# Patient Record
Sex: FEMALE | Race: WHITE | NOT HISPANIC OR LATINO
[De-identification: names, ages, dates, MRNs, and addresses within clinical notes are randomized per-mention and may not be internally consistent; named-entity substitution may affect disease eponyms.]

---

## 2022-01-18 PROBLEM — Z00.00 ENCOUNTER FOR PREVENTIVE HEALTH EXAMINATION: Status: ACTIVE | Noted: 2022-01-18

## 2022-01-19 ENCOUNTER — APPOINTMENT (OUTPATIENT)
Dept: ORTHOPEDIC SURGERY | Facility: CLINIC | Age: 67
End: 2022-01-19
Payer: MEDICARE

## 2022-01-19 VITALS — HEIGHT: 62 IN | BODY MASS INDEX: 25.21 KG/M2 | WEIGHT: 137 LBS

## 2022-01-19 DIAGNOSIS — Z86.59 PERSONAL HISTORY OF OTHER MENTAL AND BEHAVIORAL DISORDERS: ICD-10-CM

## 2022-01-19 DIAGNOSIS — Z78.9 OTHER SPECIFIED HEALTH STATUS: ICD-10-CM

## 2022-01-19 DIAGNOSIS — Z82.61 FAMILY HISTORY OF ARTHRITIS: ICD-10-CM

## 2022-01-19 PROCEDURE — 20610 DRAIN/INJ JOINT/BURSA W/O US: CPT | Mod: RT

## 2022-01-19 PROCEDURE — 73564 X-RAY EXAM KNEE 4 OR MORE: CPT | Mod: 50

## 2022-01-19 PROCEDURE — 99204 OFFICE O/P NEW MOD 45 MIN: CPT | Mod: 25

## 2022-01-19 RX ORDER — VENLAFAXINE 100 MG/1
100 TABLET ORAL
Refills: 0 | Status: ACTIVE | COMMUNITY

## 2022-01-19 RX ORDER — MELOXICAM 15 MG/1
15 TABLET ORAL DAILY
Qty: 30 | Refills: 1 | Status: ACTIVE | COMMUNITY
Start: 2022-01-19 | End: 1900-01-01

## 2022-01-19 RX ORDER — ALENDRONATE SODIUM 70 MG/1
TABLET ORAL
Refills: 0 | Status: ACTIVE | COMMUNITY

## 2022-01-19 RX ORDER — BUPROPION HYDROCHLORIDE 300 MG/1
300 TABLET, EXTENDED RELEASE ORAL
Refills: 0 | Status: ACTIVE | COMMUNITY

## 2022-01-19 NOTE — PROCEDURE
[de-identified] : The right knee was prepped with alchohol and ethyl chloride was used to numb the skin. 1.5 cc of xylocaine 1% was placed for local anaesthesia. An injection durolane (60 mg/3 ml) was then given without complication into the  joint. Patient instructed that there may be an inflammatory flare for 24 hrs , to use ice or advil if needed. \par \par Lot number [id number]\par Exp: 4/30/2024\par \par

## 2022-01-19 NOTE — HISTORY OF PRESENT ILLNESS
[de-identified] : 65 y/o female presents today for initial visit for right knee pain for 3-4 years. She denies any injury or trauma. 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 Patient states she saw Dr.Johnny Rincon at [facility] back in November 2020 who said she had a torn meniscus and needed surgery, however she has had no prior MRI done. Shes had cortisone injections in the past and stated they helped for a short period of time. She also did two bouts of physical therapy and has tried NSAIDs which only help temporarily. Currently she is still experiencing medial knee pain with stairs and walking. She denies any locking or bucking. She's here today to discuss further treatment options.

## 2022-01-19 NOTE — PHYSICAL EXAM
[de-identified] : Patient is well nourished, well-developed, in no acute distress, with appropriate mood and affect. The patient is oriented to time, place, and person. Gait evaluation does not reveal a limp. The skin examination does not reveal obvious lesions, lacerations, or ecchymosis.\par \par Right Knee: AROM 0-130. Positive medial joint tenderness. Negative Meera. Positive Rhonda's. Negative Lachman. Negative Varus/Valgus stress test. Quads 5/5  [de-identified] : Standard 4-view radiographs of the bilateral knees obtained today were reviewed today and show no acute fracture or dislocation. Right Knee Medial compartment KL2-KL3 changes \par \par \par

## 2022-01-19 NOTE — DISCUSSION/SUMMARY
[de-identified] : 68 y/o female with likely right medial compartment DJD vs degenerative medial meniscus tear. XR today shows Right Knee Medial compartment KL2-KL3 changes. She previously has seen Dr.Johnny Rincon at [facility] back and was treated with NSAIDs, Physical therapy, and cortisone injections which only helped temporarily. Given she has had over a year of conservative RX and still is experiencing persistent medial right knee pain, I feel she needs an MRI to r/o osteonecrosis or any subchondral edema vs meniscus tear.  \par \par Today we discussed conservative care with HA injections as well as surgical options. Given her medial compartment  DJD and lack of true locking I think it is reasonable to continue conservative care as she hasn't tried any injectables other than cortisone.  We also long discussion today about the risks and benefits of various Orthobiologic injection procedures. These included PRP, Lipogems/lipoaspirate injections and BMAC. The fact that these treatments are investigational and not approved by any insurance product was also discussed. \par \par \par Plan:\par - Durolane injection today for right knee\par - Mobic 15mg daily\par - Continue HEP for quad, core gluteal strengthening \par - MRI of right knee to r/o osteonecrosis vs meniscus tear. \par

## 2022-02-02 ENCOUNTER — TRANSCRIPTION ENCOUNTER (OUTPATIENT)
Age: 67
End: 2022-02-02

## 2022-02-09 ENCOUNTER — APPOINTMENT (OUTPATIENT)
Dept: ORTHOPEDIC SURGERY | Facility: CLINIC | Age: 67
End: 2022-02-09
Payer: MEDICARE

## 2022-02-09 PROCEDURE — 99213 OFFICE O/P EST LOW 20 MIN: CPT

## 2022-02-09 NOTE — HISTORY OF PRESENT ILLNESS
[de-identified] : 65 y/o female presents today for follow up for right knee. Patient states she has some definite relief from Durolane injection given on 1/19/2022. Patient had an MRI on 1/27/22 and is here today to review results and discuss further treatment options.

## 2022-02-09 NOTE — DISCUSSION/SUMMARY
[de-identified] : Cha's MRI is reviewed and shows medial joint djd and medial meniscus tear with extrusion and root involvement. \par However x-rays show varus djd. \par Therefore I have explained to her that surgically arthroscopic meniscus surgery is unlikely to be successful and unicondylar knee replacement would be the correct option. \par Her symptoms have been mitigated significantly with Durolane\par Plan \par Physical therapy \par f/u 3 months to determine if repeat injection is needed or if she is having any increased pain with ambulation we could consider  brace. \par \par \par

## 2022-05-12 ENCOUNTER — APPOINTMENT (OUTPATIENT)
Dept: ORTHOPEDIC SURGERY | Facility: CLINIC | Age: 67
End: 2022-05-12
Payer: MEDICARE

## 2022-05-12 PROCEDURE — 99213 OFFICE O/P EST LOW 20 MIN: CPT | Mod: 25

## 2022-05-12 PROCEDURE — 20610 DRAIN/INJ JOINT/BURSA W/O US: CPT | Mod: RT

## 2022-05-12 RX ORDER — MELOXICAM 15 MG/1
15 TABLET ORAL DAILY
Qty: 30 | Refills: 2 | Status: COMPLETED | COMMUNITY
Start: 2022-05-12 | End: 2022-08-10

## 2022-05-12 NOTE — HISTORY OF PRESENT ILLNESS
[de-identified] : Cha returns having had some improvement from the HA injection 6 months ago. SHe still has medial joint pain ambulating more than 2-3 blocks due to her varus knee medial joitn OA. \par

## 2022-05-12 NOTE — PROCEDURE
[de-identified] : The right knee was prepped with alchohol and ethyl chloride was used to numb the skin. 1.5 cc of xylocaine 1% was placed for local anaesthesia. An injection durolane (60 mg/3 ml) was then given without complication into the  joint. Patient instructed that there may be an inflammatory flare for 24 hrs , to use ice or advil if needed. Lot umber [id number]\par exp 10/31/2024\par \par

## 2022-05-12 NOTE — PROCEDURE
[de-identified] : The right knee was prepped with alchohol and ethyl chloride was used to numb the skin. 1.5 cc of xylocaine 1% was placed for local anaesthesia. An injection durolane (60 mg/3 ml) was then given without complication into the  joint. Patient instructed that there may be an inflammatory flare for 24 hrs , to use ice or advil if needed. Lot umber [id number]\par exp 10/31/2024\par \par

## 2022-05-12 NOTE — HISTORY OF PRESENT ILLNESS
[de-identified] : Cha returns having had some improvement from the HA injection 6 months ago. SHe still has medial joint pain ambulating more than 2-3 blocks due to her varus knee medial joitn OA. \par

## 2022-05-12 NOTE — DISCUSSION/SUMMARY
[de-identified] : Patient had repeat Durolane today. However I am highly reccomending and have prescribed a medial unloading brace for Further Rx here to help her ambulation due to her significant medial joint varus djd. \par SHe is an excellent candidate fro a UNicompartmental KR but does not want surgery. \par Plan \par Durolane injection\par Medial unloading brace \par f/u 4 weeks

## 2022-05-12 NOTE — DISCUSSION/SUMMARY
[de-identified] : Patient had repeat Durolane today. However I am highly reccomending and have prescribed a medial unloading brace for Further Rx here to help her ambulation due to her significant medial joint varus djd. \par SHe is an excellent candidate fro a UNicompartmental KR but does not want surgery. \par Plan \par Durolane injection\par Medial unloading brace \par f/u 4 weeks

## 2022-07-07 ENCOUNTER — APPOINTMENT (OUTPATIENT)
Dept: ORTHOPEDIC SURGERY | Facility: CLINIC | Age: 67
End: 2022-07-07

## 2022-07-07 PROCEDURE — 99213 OFFICE O/P EST LOW 20 MIN: CPT

## 2022-07-07 NOTE — PHYSICAL EXAM
[de-identified] : Right knee= no effusion, ROM 0-120, medial joint tenderness, positive varus laxity, Quad 5/5

## 2022-07-07 NOTE — HISTORY OF PRESENT ILLNESS
[de-identified] : 67 y/o female presents today for follow up for right knee pain. Patient states that together with the injection and the brace she has been doing much better but still experiencing pain from time to time.

## 2022-07-07 NOTE — DISCUSSION/SUMMARY
[de-identified] : 65 yo female is doing well today following daily use of medial unloading brace and durolane injection. She is still having occasional pain so I suggest she consult Dr. Gonzalez to discuss future UNI, she would be an excellent candidate. \par \par Plan\par continue to use  brace\par consult with Dr. GONZALEZ regarding UNI in the winter and the expected post op course and success etc. \par If she decides against surgery will consider follow up HA injection in December.

## 2022-09-30 ENCOUNTER — OUTPATIENT (OUTPATIENT)
Dept: OUTPATIENT SERVICES | Facility: HOSPITAL | Age: 67
LOS: 1 days | End: 2022-09-30
Payer: MEDICARE

## 2022-09-30 ENCOUNTER — APPOINTMENT (OUTPATIENT)
Dept: ORTHOPEDIC SURGERY | Facility: CLINIC | Age: 67
End: 2022-09-30

## 2022-09-30 ENCOUNTER — RESULT REVIEW (OUTPATIENT)
Age: 67
End: 2022-09-30

## 2022-09-30 VITALS
HEIGHT: 62 IN | SYSTOLIC BLOOD PRESSURE: 148 MMHG | DIASTOLIC BLOOD PRESSURE: 78 MMHG | HEART RATE: 76 BPM | WEIGHT: 137 LBS | BODY MASS INDEX: 25.21 KG/M2 | OXYGEN SATURATION: 99 %

## 2022-09-30 DIAGNOSIS — M23.303 OTHER MENISCUS DERANGEMENTS, UNSPECIFIED MEDIAL MENISCUS, RIGHT KNEE: ICD-10-CM

## 2022-09-30 DIAGNOSIS — M25.561 PAIN IN RIGHT KNEE: ICD-10-CM

## 2022-09-30 PROCEDURE — 73564 X-RAY EXAM KNEE 4 OR MORE: CPT

## 2022-09-30 PROCEDURE — 73564 X-RAY EXAM KNEE 4 OR MORE: CPT | Mod: 26,50

## 2022-09-30 PROCEDURE — 99214 OFFICE O/P EST MOD 30 MIN: CPT

## 2022-10-01 PROBLEM — M25.561 RIGHT KNEE PAIN: Noted: 2022-01-19

## 2022-10-01 PROBLEM — M23.303 DERANGEMENT OF MEDIAL MENISCUS OF RIGHT KNEE: Noted: 2022-01-19

## 2022-10-01 NOTE — HISTORY OF PRESENT ILLNESS
[4] : a current pain level of 4/10 [Walking] : worsened by walking [Ice] : relieved by ice [Rest] : relieved by rest [de-identified] : 9/30/22: 66 y/o female referred by Dr. Linares for evaluation of right medial compartment OA for which she has undergone an extensive course of conservative management, here primarily to discuss possibility of right medal compartmental knee arthroplasty. The patient has been dealing with right knee pain for several years which is localized to the medial joint line with radiation down to the mid-tibia. She also notes plantar fasciitis of the right foot. She states her most recent treatment so far has been a Durolane injection which provided good relief. She takes Tylenol as needed for pain. She denies any ambulatory limitation but notes her pain is worse when walking up and down steps. 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 She lives with her  in a flat apartment that has an elevator. \par \par PMH: osteoporosis, depression and anxiety.\par \par She denies any allergies.  [de-identified] : patient states pain is sharp and dull  [de-identified] : massage

## 2022-10-01 NOTE — END OF VISIT
[FreeTextEntry3] : All medical record entries made by the Scribe were at my, Dr. Erik Guillory, direction and personally dictated by me on 09/30/2022. I have reviewed the chart and agree that the record accurately reflects my personal performance of the history, physical exam, assessment and plan. I have also personally directed, reviewed, and agreed with the chart.

## 2022-10-01 NOTE — PHYSICAL EXAM
[de-identified] : General appearance: well nourished and hydrated, pleasant, alert and oriented x 3, cooperative.  \par HEENT: normocephalic, EOM intact, wearing mask, external auditory canal clear.  \par Cardiovascular: no lower leg edema, no varicosities, dorsalis pedis pulses palpable and symmetric.  \par Lymphatics: no palpable lymphadenopathy, no lymphedema.  \par Neurologic: sensation is normal, no muscle weakness in upper or lower extremities, patella tendon reflexes present and symmetric.  \par Dermatologic: skin moist, warm, no rash.  \par Spine: cervical spine with normal lordosis and painless range of motion, thoracic spine with normal kyphosis and painless range of motion, lumbosacral spine with normal lordosis and painless range of motion.  \par Gait: normal gait pattern, presenting with a soft knee sleeve for the right side. No other assistive device.\par \par Left knee: \par - Focal soft tissue swelling: none\par - Ecchymosis: none\par - Erythema: none\par - Effusion: none\par - Wounds: none\par - Alignment: normal\par - Tenderness: none\par - ROM: 0-140 \par - Collateral laxity: none\par - Cruciate laxity: none\par - Meniscal signs: positive Rhonda and Meera\par - Popliteal angle (degrees): 30\par - Quad strength: 5/5\par \par Right knee:\par - Focal soft tissue swelling: minimal\par - Ecchymosis: none\par - Erythema: none\par - Effusion: small\par - Wounds: none\par - Alignment: mild varus\par - Tenderness: negative lateral joint line tenderness, positive medial joint line tenderness. Negative peripatellar tenderness, negative pain on patellar compression through her entire ROM\par - ROM: 3-135, pain on terminal flexion\par - Collateral laxity: mild pseudolaxity to varus stress\par - Cruciate laxity: none\par - Popliteal angle (degrees): \par - Quad strength: 5/5 [de-identified] : B/L knee XR were reviewed, dated 09/30/2022 from [facility]. \par -Left knee: normal alignment, no arthritis. Patellar height is normal, patellar tracking is central. \par -Right knee: normal alignment, advanced medial compartmental osteoarthritis, bone on bone with some associated subchondral cystic change. There are minimal superior and lateral patellar osteophytes. Otherwise, no evidence of lateral or patellofemoral compartment disease. Patellar height is normal, patellar tracking is central. \par \par MRI of right knee was reviewed, dated 01/27/2022 from [facility].\par - This demonstrates a small, under-surface oblique tear of the lateral meniscus at the posterior horn/posterior root junction. There is a severe degenerative disease in the medial compartment including a complex tear of the medial meniscus. There is degenerative signal within the patellar cartilage with a normal TT/TG distance. Mild patellar and quadriceps tendinosis.

## 2022-10-01 NOTE — DISCUSSION/SUMMARY
[de-identified] : 68 y/o female with right medial unicompartmental OA \par -While she does have MRI imaging of some patellofemoral and some lateral compartment disease, I feel that these are clinically asymptomatic at this time and I feel that she is an excellent candidate of right medial unicompartmental knee arthroplasty with Michael robotic assistance\par - The relative merits of partial and total knee replacement were discussed in detail. The patient was informed that partial knee replacement preserves bone and cartilage as compared to total knee replacement and maintains the function of the anterior cruciate ligament. In appropriately selected patients, partial knee replacement can result in a faster, less painful recovery and a more "normal" feel to the knee with improved function in deep flexion activities. The patient was also informed that the published 10 year reoperation rate is somewhat higher for partial knee replacement, with fixation failure, progression of disease affecting un-resurfaced portions of the joint, polyethylene wear and unexplained pain being the most common causes of revision surgery. The patient was informed that conversion of partial knee replacement to total knee replacement is typically more complex than primary total knee replacement but less complex and invasive than revision of total knee replacement. We discussed the details of both procedures, the expected recovery periods, and the expected outcomes. We discussed the likelihood of satisfaction after complete recovery, and the potential causes of dissatisfaction. The importance of active patient participation in the rehabilitation protocol was emphasized, along with its influence on short and long-term outcomes.  We discussed the possibility that intra-operative findings would dictate conversion to total knee replacement, and the influence that change would have on recovery and long term outcome. Specific risks of partial and total knee replacement were discussed in detail. We discussed the risk of surgical site complications including but not limited to: surgical site infection, wound healing complications, bone fracture, tendon or ligament injury, neurovascular injury, hemorrhage, postoperative stiffness or instability, persistent pain and need for reoperation or manipulation under anesthesia. We discussed surgical blood loss and the small risk of requiring blood transfusion. We discussed the risk of perioperative medical complications, including but not limited to catheter-associated urinary tract infection, venous thromboembolism and other cardiopulmonary complications. We discussed anesthetic options and the small risk of anesthesia-related complications. We discussed the durability of knee replacements and limitations related to progression of arthritis in remaining compartments, polyethylene wear, osteolysis, loosening and unexplained pain. The role of the robot was discussed with respect to bone preparation and ligament balancing. The patient was given a copy of my preoperative packet with additional information about the procedure.  The patient gave informed consent for the surgical procedure and was instructed to speak to my surgical coordinator to arrange the logistics of surgical scheduling, presurgical testing, and medical optimization and clearance. \par - She indicated that she would like to plan for a surgery sometime in the winter, likely in December or January due to work constraints and we will accommodate that. She will be booked for a convenient date with standard medical clearance. \par - She was encouraged to call or come back with any concerns or issues.

## 2022-10-12 ENCOUNTER — APPOINTMENT (OUTPATIENT)
Dept: ORTHOPEDIC SURGERY | Facility: CLINIC | Age: 67
End: 2022-10-12

## 2022-10-12 PROCEDURE — 99213 OFFICE O/P EST LOW 20 MIN: CPT

## 2022-10-12 NOTE — HISTORY OF PRESENT ILLNESS
[de-identified] : 67 years old female presented with right knee pain. Pt states the pain comes and goes and pain level is a 3. Pt also states she has finished physical therapy and does few exercises a once or twice a week. Wearing her  brace does help relieve pain when walking. She had a visit with Dr. Guillory and decided on a partial knee replacement scheduled for December or January. She is here today for a repeat Durolane HA injection.

## 2022-10-12 NOTE — DISCUSSION/SUMMARY
[de-identified] : 66 yo female here for f/u of right knee OA. She had a meeting with Dr. Guillory and has decided to schedule a uni knee replacement in December or January. She also wanted a repeat HA injection today, but she is too early to receive one and it would interfere with her future knee replacement surgery. She will follow up with us as needed.\par Plan Meloxicam PO for discomfort\par F/U with Dr Guillory

## 2022-10-14 RX ORDER — MELOXICAM 15 MG/1
15 TABLET ORAL DAILY
Qty: 30 | Refills: 1 | Status: ACTIVE | COMMUNITY
Start: 2022-10-14 | End: 1900-01-01

## 2023-02-10 VITALS
DIASTOLIC BLOOD PRESSURE: 75 MMHG | OXYGEN SATURATION: 100 % | WEIGHT: 128.09 LBS | TEMPERATURE: 97 F | RESPIRATION RATE: 16 BRPM | HEIGHT: 62 IN | HEART RATE: 65 BPM | SYSTOLIC BLOOD PRESSURE: 138 MMHG

## 2023-02-10 NOTE — H&P ADULT - NSHPPHYSICALEXAM_GEN_ALL_CORE
Gen: 66 y/o female, NAD  MSK: decreased ROM 2/2 pain at the right knee       Remainder of exam per medical clearance note Gen:66 y/o female,  NAD  MSK: decreased ROM 2/2 pain at the right knee     Motor: quad/TA/GS/EHL/FHl 5/5 bilateral lower extremities  Sensation: intact to light touch throughout bilateral lower extremities  Pulses: 2+ DP pulses bilaterally, extremities warm and well perfused, capillary refill brisk     Remainder of exam per medical clearance note

## 2023-02-10 NOTE — H&P ADULT - PROBLEM SELECTOR PLAN 1
Admit to Orthopedic Service   For elective Right partial vs total knee replacement   Medically cleared and optimized for surgery by Dr Stoner Admit to Orthopedic Service   For elective Right medial unicompartmental vs total knee replacement  Medically cleared and optimized for surgery by Dr Stoner

## 2023-02-10 NOTE — H&P ADULT - NSHPLABSRESULTS_GEN_ALL_CORE
Labs 1/31  Preop CBC, BMP, PT/INR, PTT within normal range   Cr 0.88  UA negative   Preop CXR within normal limits, reviewed per medical clearance   Preop EKG NSR and reviewed per medical clearance   DOS Labs 1/31  Preop CBC, BMP, PT/INR, PTT within normal range   Cr 0.88  UA negative   Preop CXR within normal limits, reviewed per medical clearance   Preop EKG NSR and reviewed per medical clearance   DANO  COVID: ___ Labs 1/31  Preop CBC, BMP, PT/INR, PTT within normal range   Cr 0.88  UA negative   Preop CXR within normal limits, reviewed per medical clearance   Preop EKG NSR and reviewed per medical clearance  3M DOS  COVID negative

## 2023-02-10 NOTE — H&P ADULT - HISTORY OF PRESENT ILLNESS
Patient is 66 y/o female who presents with c/o right knee pain x   Patient elects to undergo Right partial vs Total knee replacement with Dr. Guillory  Patient is 66 y/o female who presents with c/o right knee pain x   Patient elects to undergo Right medial unicompartmental knee replacement, possible TKR with Dr. Guillory  Patient is 66 y/o female who presents with c/o right knee pain x years. Patient notes knee pain is chronic and has worsened over time. Denies use of a cane/walker. Endorses stiffness, limited ROM. Patient notes failure of conservative management including oral analgesics. Activity modification. Denies recent surgery, hospitalization, use of antibiotics in the last 3 months.   Patient elects to undergo Right medial unicompartmental knee replacement, possible TKR with Dr. Guillory

## 2023-02-10 NOTE — ASU PATIENT PROFILE, ADULT - NSICDXPASTSURGICALHX_GEN_ALL_CORE_FT
PAST SURGICAL HISTORY:  H/O ovarian cystectomy     History of tonsillectomy     [place] teeth removed

## 2023-02-10 NOTE — ASU PATIENT PROFILE, ADULT - FALL HARM RISK - HARM RISK INTERVENTIONS

## 2023-02-22 RX ORDER — OXYCODONE 5 MG/1
5 TABLET ORAL
Qty: 50 | Refills: 0 | Status: ACTIVE | COMMUNITY
Start: 2023-02-22 | End: 1900-01-01

## 2023-02-22 RX ORDER — CELECOXIB 200 MG/1
200 CAPSULE ORAL TWICE DAILY
Qty: 60 | Refills: 2 | Status: ACTIVE | COMMUNITY
Start: 2023-02-22 | End: 1900-01-01

## 2023-02-22 RX ORDER — ASPIRIN 81 MG/1
81 TABLET ORAL
Qty: 60 | Refills: 0 | Status: ACTIVE | COMMUNITY
Start: 2023-02-22 | End: 1900-01-01

## 2023-02-22 RX ORDER — MORPHINE SULFATE 15 MG/1
15 TABLET, FILM COATED, EXTENDED RELEASE ORAL
Qty: 28 | Refills: 0 | Status: ACTIVE | COMMUNITY
Start: 2023-02-22 | End: 1900-01-01

## 2023-02-22 RX ORDER — PANTOPRAZOLE 40 MG/1
40 TABLET, DELAYED RELEASE ORAL DAILY
Qty: 30 | Refills: 2 | Status: ACTIVE | COMMUNITY
Start: 2023-02-22 | End: 1900-01-01

## 2023-02-22 RX ORDER — ACETAMINOPHEN 500 MG/1
500 TABLET ORAL
Qty: 180 | Refills: 2 | Status: ACTIVE | COMMUNITY
Start: 2023-02-22 | End: 1900-01-01

## 2023-02-24 RX ORDER — POVIDONE-IODINE 5 %
1 AEROSOL (ML) TOPICAL ONCE
Refills: 0 | Status: COMPLETED | OUTPATIENT
Start: 2023-02-27 | End: 2023-02-27

## 2023-02-26 ENCOUNTER — TRANSCRIPTION ENCOUNTER (OUTPATIENT)
Age: 68
End: 2023-02-26

## 2023-02-27 ENCOUNTER — RESULT REVIEW (OUTPATIENT)
Age: 68
End: 2023-02-27

## 2023-02-27 ENCOUNTER — TRANSCRIPTION ENCOUNTER (OUTPATIENT)
Age: 68
End: 2023-02-27

## 2023-02-27 ENCOUNTER — OUTPATIENT (OUTPATIENT)
Dept: INPATIENT UNIT | Facility: HOSPITAL | Age: 68
LOS: 1 days | Discharge: HOME | End: 2023-02-27
Payer: MEDICARE

## 2023-02-27 ENCOUNTER — APPOINTMENT (OUTPATIENT)
Dept: ORTHOPEDIC SURGERY | Facility: HOSPITAL | Age: 68
End: 2023-02-27

## 2023-02-27 VITALS
TEMPERATURE: 97 F | OXYGEN SATURATION: 100 % | DIASTOLIC BLOOD PRESSURE: 59 MMHG | RESPIRATION RATE: 17 BRPM | HEART RATE: 75 BPM | SYSTOLIC BLOOD PRESSURE: 128 MMHG

## 2023-02-27 DIAGNOSIS — F41.9 ANXIETY DISORDER, UNSPECIFIED: ICD-10-CM

## 2023-02-27 DIAGNOSIS — K08.409 PARTIAL LOSS OF TEETH, UNSPECIFIED CAUSE, UNSPECIFIED CLASS: Chronic | ICD-10-CM

## 2023-02-27 DIAGNOSIS — M17.11 UNILATERAL PRIMARY OSTEOARTHRITIS, RIGHT KNEE: ICD-10-CM

## 2023-02-27 DIAGNOSIS — Z90.89 ACQUIRED ABSENCE OF OTHER ORGANS: Chronic | ICD-10-CM

## 2023-02-27 DIAGNOSIS — Z98.890 OTHER SPECIFIED POSTPROCEDURAL STATES: Chronic | ICD-10-CM

## 2023-02-27 LAB — GLUCOSE BLDC GLUCOMTR-MCNC: 160 MG/DL — HIGH (ref 70–99)

## 2023-02-27 PROCEDURE — 73560 X-RAY EXAM OF KNEE 1 OR 2: CPT

## 2023-02-27 PROCEDURE — S2900 ROBOTIC SURGICAL SYSTEM: CPT | Mod: NC

## 2023-02-27 PROCEDURE — 0055T BONE SRGRY CMPTR CT/MRI IMAG: CPT

## 2023-02-27 PROCEDURE — S2900: CPT

## 2023-02-27 PROCEDURE — C1776: CPT

## 2023-02-27 PROCEDURE — 97116 GAIT TRAINING THERAPY: CPT

## 2023-02-27 PROCEDURE — 82962 GLUCOSE BLOOD TEST: CPT

## 2023-02-27 PROCEDURE — 97161 PT EVAL LOW COMPLEX 20 MIN: CPT

## 2023-02-27 PROCEDURE — C1713: CPT

## 2023-02-27 PROCEDURE — 27446 REVISION OF KNEE JOINT: CPT | Mod: RT

## 2023-02-27 PROCEDURE — 73560 X-RAY EXAM OF KNEE 1 OR 2: CPT | Mod: 26,RT

## 2023-02-27 PROCEDURE — 97110 THERAPEUTIC EXERCISES: CPT

## 2023-02-27 DEVICE — IMPLANTABLE DEVICE: Type: IMPLANTABLE DEVICE | Site: RIGHT | Status: FUNCTIONAL

## 2023-02-27 DEVICE — MAKO BONE PIN 4MM X 110MM: Type: IMPLANTABLE DEVICE | Site: RIGHT | Status: FUNCTIONAL

## 2023-02-27 DEVICE — CEMENT PALACOS R: Type: IMPLANTABLE DEVICE | Site: RIGHT | Status: FUNCTIONAL

## 2023-02-27 DEVICE — MAKO BONE PIN 4MM X 140MM: Type: IMPLANTABLE DEVICE | Site: RIGHT | Status: FUNCTIONAL

## 2023-02-27 RX ORDER — MAGNESIUM HYDROXIDE 400 MG/1
30 TABLET, CHEWABLE ORAL ONCE
Refills: 0 | Status: DISCONTINUED | OUTPATIENT
Start: 2023-02-27 | End: 2023-02-27

## 2023-02-27 RX ORDER — CELECOXIB 200 MG/1
200 CAPSULE ORAL ONCE
Refills: 0 | Status: DISCONTINUED | OUTPATIENT
Start: 2023-02-28 | End: 2023-02-27

## 2023-02-27 RX ORDER — ACETAMINOPHEN 500 MG
975 TABLET ORAL ONCE
Refills: 0 | Status: COMPLETED | OUTPATIENT
Start: 2023-02-27 | End: 2023-02-27

## 2023-02-27 RX ORDER — CLONAZEPAM 1 MG
0 TABLET ORAL
Qty: 0 | Refills: 0 | DISCHARGE

## 2023-02-27 RX ORDER — VENLAFAXINE HCL 75 MG
1 CAPSULE, EXT RELEASE 24 HR ORAL
Qty: 0 | Refills: 0 | DISCHARGE

## 2023-02-27 RX ORDER — SENNA PLUS 8.6 MG/1
2 TABLET ORAL ONCE
Refills: 0 | Status: DISCONTINUED | OUTPATIENT
Start: 2023-02-27 | End: 2023-02-27

## 2023-02-27 RX ORDER — POLYETHYLENE GLYCOL 3350 17 G/17G
17 POWDER, FOR SOLUTION ORAL ONCE
Refills: 0 | Status: DISCONTINUED | OUTPATIENT
Start: 2023-02-27 | End: 2023-02-27

## 2023-02-27 RX ORDER — OXYCODONE HYDROCHLORIDE 5 MG/1
5 TABLET ORAL
Refills: 0 | Status: DISCONTINUED | OUTPATIENT
Start: 2023-02-27 | End: 2023-02-27

## 2023-02-27 RX ORDER — CHLORHEXIDINE GLUCONATE 213 G/1000ML
1 SOLUTION TOPICAL ONCE
Refills: 0 | Status: COMPLETED | OUTPATIENT
Start: 2023-02-27 | End: 2023-02-27

## 2023-02-27 RX ORDER — ACETAMINOPHEN 500 MG
1000 TABLET ORAL ONCE
Refills: 0 | Status: COMPLETED | OUTPATIENT
Start: 2023-02-27 | End: 2023-02-27

## 2023-02-27 RX ORDER — CELECOXIB 200 MG/1
400 CAPSULE ORAL ONCE
Refills: 0 | Status: COMPLETED | OUTPATIENT
Start: 2023-02-27 | End: 2023-02-27

## 2023-02-27 RX ORDER — ALENDRONATE SODIUM 70 MG/1
1 TABLET ORAL
Qty: 0 | Refills: 0 | DISCHARGE

## 2023-02-27 RX ORDER — APREPITANT 80 MG/1
40 CAPSULE ORAL ONCE
Refills: 0 | Status: COMPLETED | OUTPATIENT
Start: 2023-02-27 | End: 2023-02-27

## 2023-02-27 RX ORDER — CEFAZOLIN SODIUM 1 G
2000 VIAL (EA) INJECTION ONCE
Refills: 0 | Status: COMPLETED | OUTPATIENT
Start: 2023-02-27 | End: 2023-02-27

## 2023-02-27 RX ORDER — BUPROPION HYDROCHLORIDE 150 MG/1
1 TABLET, EXTENDED RELEASE ORAL
Qty: 0 | Refills: 0 | DISCHARGE

## 2023-02-27 RX ORDER — ONDANSETRON 8 MG/1
4 TABLET, FILM COATED ORAL ONCE
Refills: 0 | Status: DISCONTINUED | OUTPATIENT
Start: 2023-02-27 | End: 2023-02-27

## 2023-02-27 RX ADMIN — APREPITANT 40 MILLIGRAM(S): 80 CAPSULE ORAL at 06:33

## 2023-02-27 RX ADMIN — OXYCODONE HYDROCHLORIDE 5 MILLIGRAM(S): 5 TABLET ORAL at 16:55

## 2023-02-27 RX ADMIN — OXYCODONE HYDROCHLORIDE 5 MILLIGRAM(S): 5 TABLET ORAL at 15:55

## 2023-02-27 RX ADMIN — Medication 100 MILLIGRAM(S): at 14:54

## 2023-02-27 RX ADMIN — CELECOXIB 400 MILLIGRAM(S): 200 CAPSULE ORAL at 06:33

## 2023-02-27 RX ADMIN — Medication 1 APPLICATION(S): at 06:41

## 2023-02-27 RX ADMIN — Medication 1000 MILLIGRAM(S): at 06:33

## 2023-02-27 RX ADMIN — CHLORHEXIDINE GLUCONATE 1 APPLICATION(S): 213 SOLUTION TOPICAL at 06:41

## 2023-02-27 RX ADMIN — Medication 975 MILLIGRAM(S): at 12:40

## 2023-02-27 RX ADMIN — Medication 975 MILLIGRAM(S): at 13:40

## 2023-02-27 NOTE — PHYSICAL THERAPY INITIAL EVALUATION ADULT - TRANSFER SAFETY CONCERNS NOTED: SIT/STAND, REHAB EVAL
Performed sit<>stand x 3 trials. Patient with 1 episode of R knee buckling/LOB resulting in quick descend onto bed. Fair eccentric control during remaining transfer trials./decreased weight-shifting ability

## 2023-02-27 NOTE — ASU DISCHARGE PLAN (ADULT/PEDIATRIC) - NS MD DC FALL RISK RISK
For information on Fall & Injury Prevention, visit: [email or web address] OR  [email or web address] OR  https://www.cdc.gov/steadi/patient.html

## 2023-02-27 NOTE — ASU DISCHARGE PLAN (ADULT/PEDIATRIC) - ASU DC SPECIAL INSTRUCTIONSFT
Meds sent to [facility]  See Dr. Guillory's discharge instructions regarding post op medication & instructions. For any further questions call Dr. Guillory's office

## 2023-02-27 NOTE — ASU DISCHARGE PLAN (ADULT/PEDIATRIC) - CARE PROVIDER_API CALL
Erik Guillory)  Orthopaedic Surgery  [address] [facility], [place] [address]  Phone: [phone]  Fax: [phone]  Follow Up Time:

## 2023-02-27 NOTE — PHYSICAL THERAPY INITIAL EVALUATION ADULT - GENERAL OBSERVATIONS, REHAB EVAL
PT IE completed. Patient ambulated 15 feet x1 and 30 feet x1 with Min A x1 + RW. Patient received semi supine in bed +tele, +heplock IV, +RA, +R knee ace wrapping + dressing C/D/I, +(B) SCDs, NAD, willing to work with PT.

## 2023-02-27 NOTE — PHYSICAL THERAPY INITIAL EVALUATION ADULT - PERTINENT HX OF CURRENT PROBLEM, REHAB EVAL
Patient is 66 y/o female who presents with c/o right knee pain x years. Patient notes knee pain is chronic and has worsened over time. Denies use of a cane/walker. Endorses stiffness, limited ROM. Patient notes failure of conservative management including oral analgesics. Activity modification. Denies recent surgery, hospitalization, use of antibiotics in the last 3 months.   Patient elects to undergo Right medial unicompartmental knee replacement, possible TKR with Dr. Guillory

## 2023-02-27 NOTE — PHYSICAL THERAPY INITIAL EVALUATION ADULT - ADDITIONAL COMMENTS
Community ambulator who lives with her  in elevator access apartment, no BRIANA. Independent with all ADLs prior and denies use of AD when ambulating, except for occasional SC use.

## 2023-02-27 NOTE — DISCHARGE NOTE NURSING/CASE MANAGEMENT/SOCIAL WORK - PATIENT PORTAL LINK FT
You can access the FollowMyHealth Patient Portal offered by [facility] by registering at the following website: [email or web address]. By joining [facility]’s FollowMyHealth portal, you will also be able to view your health information using other applications (apps) compatible with our system.

## 2023-02-27 NOTE — PROGRESS NOTE ADULT - SUBJECTIVE AND OBJECTIVE BOX
Orthopedics Post Op Check    Procedure: right UKA   Surgeon: Dr Guillory     Pt comfortable, without complaints. Pain well controlled in PACU   Denies CP, SOB, N/V, numbness/tingling     Vital Signs Last 24 Hrs  T(C): 35.9 (27 Feb 2023 10:23), Max: 36.3 (27 Feb 2023 07:22)  T(F): 96.7 (27 Feb 2023 10:23), Max: 96.7 (27 Feb 2023 10:23)  HR: 74 (27 Feb 2023 15:55) (52 - 74)  BP: 156/67 (27 Feb 2023 14:39) (120/57 - 156/67)  BP(mean): 97 (27 Feb 2023 14:39) (81 - 105)  RR: 17 (27 Feb 2023 15:55) (7 - 19)  SpO2: 99% (27 Feb 2023 15:55) (95% - 100%)    Parameters below as of 27 Feb 2023 14:39  Patient On (Oxygen Delivery Method): room air      AVSS, NAD      General: Pt Alert and oriented, comfortable  Dressing C/D/I aquacel, gauze/tegaderm   Sensation intact and equal BLE   Motor ehl/ta/gs/fhl 5/5 BLE, negative pedro BLE   Pulses dp palpable BLE       Post op XR: s/p right UKA     A/P: 67yFemale POD#0 s/p right UKA   - Stable  - Pain Control  - DVT ppx: SCDs  - Fadumo-op abx: Ancef  - PT, WBS:  WBAT   - F/U AM Labs  - f/u PT clearance for discharge POD-0

## 2023-02-27 NOTE — DISCHARGE NOTE NURSING/CASE MANAGEMENT/SOCIAL WORK - NSDCPEFALRISK_GEN_ALL_CORE
For information on Fall & Injury Prevention, visit: [email or web address] OR  [email or web address] OR  https://www.cdc.gov/steadi/patient.html

## 2023-02-27 NOTE — PHYSICAL THERAPY INITIAL EVALUATION ADULT - GAIT DEVIATIONS NOTED, PT EVAL
Impaired weight shifting ability + decreased (B) knee flexion. Decreased blaise with 1 episode of R knee buckling observed, benefiting from PT assist for safety./decreased blaise/increased time in double stance/decreased velocity of limb motion/decreased step length/decreased stride length/decreased weight-shifting ability

## 2023-02-28 ENCOUNTER — EMERGENCY (EMERGENCY)
Facility: HOSPITAL | Age: 68
LOS: 1 days | Discharge: HOME | End: 2023-02-28
Attending: EMERGENCY MEDICINE | Admitting: EMERGENCY MEDICINE
Payer: MEDICARE

## 2023-02-28 VITALS
OXYGEN SATURATION: 100 % | DIASTOLIC BLOOD PRESSURE: 50 MMHG | RESPIRATION RATE: 16 BRPM | SYSTOLIC BLOOD PRESSURE: 128 MMHG | HEART RATE: 59 BPM | TEMPERATURE: 98 F

## 2023-02-28 VITALS
TEMPERATURE: 97 F | HEIGHT: 62 IN | DIASTOLIC BLOOD PRESSURE: 60 MMHG | HEART RATE: 61 BPM | WEIGHT: 128.09 LBS | OXYGEN SATURATION: 100 % | SYSTOLIC BLOOD PRESSURE: 102 MMHG | RESPIRATION RATE: 16 BRPM

## 2023-02-28 DIAGNOSIS — Z98.890 OTHER SPECIFIED POSTPROCEDURAL STATES: Chronic | ICD-10-CM

## 2023-02-28 DIAGNOSIS — R63.0 ANOREXIA: ICD-10-CM

## 2023-02-28 DIAGNOSIS — Z90.89 ACQUIRED ABSENCE OF OTHER ORGANS: ICD-10-CM

## 2023-02-28 DIAGNOSIS — Z98.818 OTHER DENTAL PROCEDURE STATUS: ICD-10-CM

## 2023-02-28 DIAGNOSIS — Z20.822 CONTACT WITH AND (SUSPECTED) EXPOSURE TO COVID-19: ICD-10-CM

## 2023-02-28 DIAGNOSIS — R55 SYNCOPE AND COLLAPSE: ICD-10-CM

## 2023-02-28 DIAGNOSIS — K08.409 PARTIAL LOSS OF TEETH, UNSPECIFIED CAUSE, UNSPECIFIED CLASS: Chronic | ICD-10-CM

## 2023-02-28 DIAGNOSIS — Z87.42 PERSONAL HISTORY OF OTHER DISEASES OF THE FEMALE GENITAL TRACT: ICD-10-CM

## 2023-02-28 DIAGNOSIS — F41.8 OTHER SPECIFIED ANXIETY DISORDERS: ICD-10-CM

## 2023-02-28 DIAGNOSIS — Z87.39 PERSONAL HISTORY OF OTHER DISEASES OF THE MUSCULOSKELETAL SYSTEM AND CONNECTIVE TISSUE: ICD-10-CM

## 2023-02-28 DIAGNOSIS — Z90.89 ACQUIRED ABSENCE OF OTHER ORGANS: Chronic | ICD-10-CM

## 2023-02-28 DIAGNOSIS — Z96.651 PRESENCE OF RIGHT ARTIFICIAL KNEE JOINT: ICD-10-CM

## 2023-02-28 LAB
ALBUMIN SERPL ELPH-MCNC: 4 G/DL — SIGNIFICANT CHANGE UP (ref 3.3–5)
ALP SERPL-CCNC: 52 U/L — SIGNIFICANT CHANGE UP (ref 40–120)
ALT FLD-CCNC: 10 U/L — SIGNIFICANT CHANGE UP (ref 10–45)
ANION GAP SERPL CALC-SCNC: 6 MMOL/L — SIGNIFICANT CHANGE UP (ref 5–17)
APPEARANCE UR: CLEAR — SIGNIFICANT CHANGE UP
APTT BLD: 29.7 SEC — SIGNIFICANT CHANGE UP (ref 27.5–35.5)
AST SERPL-CCNC: 20 U/L — SIGNIFICANT CHANGE UP (ref 10–40)
BASOPHILS # BLD AUTO: 0.02 K/UL — SIGNIFICANT CHANGE UP (ref 0–0.2)
BASOPHILS NFR BLD AUTO: 0.2 % — SIGNIFICANT CHANGE UP (ref 0–2)
BILIRUB SERPL-MCNC: 0.2 MG/DL — SIGNIFICANT CHANGE UP (ref 0.2–1.2)
BILIRUB UR-MCNC: NEGATIVE — SIGNIFICANT CHANGE UP
BUN SERPL-MCNC: 19 MG/DL — SIGNIFICANT CHANGE UP (ref 7–23)
CALCIUM SERPL-MCNC: 9 MG/DL — SIGNIFICANT CHANGE UP (ref 8.4–10.5)
CHLORIDE SERPL-SCNC: 102 MMOL/L — SIGNIFICANT CHANGE UP (ref 96–108)
CK MB CFR SERPL CALC: 3.7 NG/ML — SIGNIFICANT CHANGE UP (ref 0–6.7)
CK SERPL-CCNC: 467 U/L — HIGH (ref 25–170)
CO2 SERPL-SCNC: 27 MMOL/L — SIGNIFICANT CHANGE UP (ref 22–31)
COLOR SPEC: YELLOW — SIGNIFICANT CHANGE UP
CREAT SERPL-MCNC: 0.97 MG/DL — SIGNIFICANT CHANGE UP (ref 0.5–1.3)
DIFF PNL FLD: NEGATIVE — SIGNIFICANT CHANGE UP
EGFR: 63 ML/MIN/1.73M2 — SIGNIFICANT CHANGE UP
EOSINOPHIL # BLD AUTO: 0.08 K/UL — SIGNIFICANT CHANGE UP (ref 0–0.5)
EOSINOPHIL NFR BLD AUTO: 0.7 % — SIGNIFICANT CHANGE UP (ref 0–6)
GLUCOSE SERPL-MCNC: 107 MG/DL — HIGH (ref 70–99)
GLUCOSE UR QL: NEGATIVE — SIGNIFICANT CHANGE UP
HCT VFR BLD CALC: 30.3 % — LOW (ref 34.5–45)
HGB BLD-MCNC: 9.9 G/DL — LOW (ref 11.5–15.5)
IMM GRANULOCYTES NFR BLD AUTO: 0.3 % — SIGNIFICANT CHANGE UP (ref 0–0.9)
INR BLD: 1.08 — SIGNIFICANT CHANGE UP (ref 0.88–1.16)
KETONES UR-MCNC: NEGATIVE — SIGNIFICANT CHANGE UP
LEUKOCYTE ESTERASE UR-ACNC: NEGATIVE — SIGNIFICANT CHANGE UP
LYMPHOCYTES # BLD AUTO: 1.88 K/UL — SIGNIFICANT CHANGE UP (ref 1–3.3)
LYMPHOCYTES # BLD AUTO: 15.7 % — SIGNIFICANT CHANGE UP (ref 13–44)
MAGNESIUM SERPL-MCNC: 1.8 MG/DL — SIGNIFICANT CHANGE UP (ref 1.6–2.6)
MCHC RBC-ENTMCNC: 30.5 PG — SIGNIFICANT CHANGE UP (ref 27–34)
MCHC RBC-ENTMCNC: 32.7 GM/DL — SIGNIFICANT CHANGE UP (ref 32–36)
MCV RBC AUTO: 93.2 FL — SIGNIFICANT CHANGE UP (ref 80–100)
MONOCYTES # BLD AUTO: 1.16 K/UL — HIGH (ref 0–0.9)
MONOCYTES NFR BLD AUTO: 9.7 % — SIGNIFICANT CHANGE UP (ref 2–14)
NEUTROPHILS # BLD AUTO: 8.83 K/UL — HIGH (ref 1.8–7.4)
NEUTROPHILS NFR BLD AUTO: 73.4 % — SIGNIFICANT CHANGE UP (ref 43–77)
NITRITE UR-MCNC: NEGATIVE — SIGNIFICANT CHANGE UP
NRBC # BLD: 0 /100 WBCS — SIGNIFICANT CHANGE UP (ref 0–0)
PH UR: 6 — SIGNIFICANT CHANGE UP (ref 5–8)
PHOSPHATE SERPL-MCNC: 3.5 MG/DL — SIGNIFICANT CHANGE UP (ref 2.5–4.5)
PLATELET # BLD AUTO: 205 K/UL — SIGNIFICANT CHANGE UP (ref 150–400)
POTASSIUM SERPL-MCNC: 4.2 MMOL/L — SIGNIFICANT CHANGE UP (ref 3.5–5.3)
POTASSIUM SERPL-SCNC: 4.2 MMOL/L — SIGNIFICANT CHANGE UP (ref 3.5–5.3)
PROT SERPL-MCNC: 6.2 G/DL — SIGNIFICANT CHANGE UP (ref 6–8.3)
PROT UR-MCNC: NEGATIVE MG/DL — SIGNIFICANT CHANGE UP
PROTHROM AB SERPL-ACNC: 12.9 SEC — SIGNIFICANT CHANGE UP (ref 10.5–13.4)
RBC # BLD: 3.25 M/UL — LOW (ref 3.8–5.2)
RBC # FLD: 13 % — SIGNIFICANT CHANGE UP (ref 10.3–14.5)
SARS-COV-2 RNA SPEC QL NAA+PROBE: NEGATIVE — SIGNIFICANT CHANGE UP
SODIUM SERPL-SCNC: 135 MMOL/L — SIGNIFICANT CHANGE UP (ref 135–145)
SP GR SPEC: 1.02 — SIGNIFICANT CHANGE UP (ref 1–1.03)
TROPONIN T SERPL-MCNC: <0.01 NG/ML — SIGNIFICANT CHANGE UP (ref 0–0.01)
UROBILINOGEN FLD QL: 0.2 E.U./DL — SIGNIFICANT CHANGE UP
WBC # BLD: 12 K/UL — HIGH (ref 3.8–10.5)
WBC # FLD AUTO: 12 K/UL — HIGH (ref 3.8–10.5)

## 2023-02-28 PROCEDURE — 85025 COMPLETE CBC W/AUTO DIFF WBC: CPT

## 2023-02-28 PROCEDURE — 99285 EMERGENCY DEPT VISIT HI MDM: CPT

## 2023-02-28 PROCEDURE — 36415 COLL VENOUS BLD VENIPUNCTURE: CPT

## 2023-02-28 PROCEDURE — 82550 ASSAY OF CK (CPK): CPT

## 2023-02-28 PROCEDURE — 84484 ASSAY OF TROPONIN QUANT: CPT

## 2023-02-28 PROCEDURE — 84100 ASSAY OF PHOSPHORUS: CPT

## 2023-02-28 PROCEDURE — 80053 COMPREHEN METABOLIC PANEL: CPT

## 2023-02-28 PROCEDURE — 87635 SARS-COV-2 COVID-19 AMP PRB: CPT

## 2023-02-28 PROCEDURE — 82553 CREATINE MB FRACTION: CPT

## 2023-02-28 PROCEDURE — 83735 ASSAY OF MAGNESIUM: CPT

## 2023-02-28 PROCEDURE — 99284 EMERGENCY DEPT VISIT MOD MDM: CPT

## 2023-02-28 PROCEDURE — 93005 ELECTROCARDIOGRAM TRACING: CPT

## 2023-02-28 PROCEDURE — 85730 THROMBOPLASTIN TIME PARTIAL: CPT

## 2023-02-28 PROCEDURE — 85610 PROTHROMBIN TIME: CPT

## 2023-02-28 PROCEDURE — 81003 URINALYSIS AUTO W/O SCOPE: CPT

## 2023-02-28 RX ORDER — SODIUM CHLORIDE 9 MG/ML
1000 INJECTION INTRAMUSCULAR; INTRAVENOUS; SUBCUTANEOUS ONCE
Refills: 0 | Status: COMPLETED | OUTPATIENT
Start: 2023-02-28 | End: 2023-02-28

## 2023-02-28 RX ADMIN — SODIUM CHLORIDE 1000 MILLILITER(S): 9 INJECTION INTRAMUSCULAR; INTRAVENOUS; SUBCUTANEOUS at 13:15

## 2023-02-28 NOTE — ED ADULT NURSE NOTE - FINAL NURSING ELECTRONIC SIGNATURE
Telephone Encounter by Debra Maier RN at 11/08/18 03:28 PM     Author:  Debra Maier RN Service:  (none) Author Type:  Registered Nurse     Filed:  11/08/18 03:28 PM Encounter Date:  11/8/2018 Status:  Signed     :  Debra Maier RN (Registered Nurse)       From: Negar Cordova  To: Lalita Newton MD  Sent: 11/8/2018  3:18 PM CST  Subject: Upcoming Appointment    My committed relationship partner has a vasectomy. I want to have it   replaced due to the irregularity and heavy bleeding. I can happily take a   pregnancy test next thurs.        Revision History        Date/Time User Provider Type Action    > 11/08/18 03:28 PM Debra Maier RN Registered Nurse Sign    Attribution information within the note text is not available.            
Telephone Encounter by Debra Maier RN at 11/08/18 03:43 PM     Author:  Debra Maier RN Service:  (none) Author Type:  Registered Nurse     Filed:  11/08/18 03:43 PM Encounter Date:  11/8/2018 Status:  Signed     :  Debra Maier RN (Registered Nurse)            appointment canceled   mychart message to patient to call and shedule IUD 30 min appointment[KA1.1M]       Revision History        User Key Date/Time User Provider Type Action    > KA1.1 11/08/18 03:43 PM Debra Maier, RN Registered Nurse Sign    M - Manual            
28-Feb-2023 15:23

## 2023-02-28 NOTE — ED ADULT NURSE NOTE - NSICDXPASTSURGICALHX_GEN_ALL_CORE_FT
PAST SURGICAL HISTORY:  H/O ovarian cystectomy     History of tonsillectomy     [place] teeth removed

## 2023-02-28 NOTE — ED ADULT NURSE NOTE - OBJECTIVE STATEMENT
68yo F arrived to the ED c/o of syncope this morning. Pt stated "I was taking my medication this morning and went to get some water when I started feeling nauseous and dizzy". Pt  reports that he sat her in a chair then +LOC. Pt denies head trauma or trauma to the body. Pt s/p partial knee replacement yesterday. Denies cp, sob and blood thinners. Pt reports having taken pain medications. Pt right knee incision with aquacel dressing and Dameon stockings.

## 2023-02-28 NOTE — ED PROVIDER NOTE - CLINICAL SUMMARY MEDICAL DECISION MAKING FREE TEXT BOX
67-year-old with syncope, with prodromal symptoms consistent with likely vasovagal, suspect lack of p.o. intake and pain medications had contributed, patient is EKG is reassuring with no dysrhythmia or signs of ischemia.  Will evaluate for metabolic abnormalities hydrate and if feels better will discharge with advised outpatient follow-up.

## 2023-02-28 NOTE — CONSULT NOTE ADULT - SUBJECTIVE AND OBJECTIVE BOX
Orthopaedic Consult Note    HPI  67yF POD #1 s/p right UKR presents to [facility] ED s/p syncopal episode this morning. Pt states she went to stand up this AM and felt immediately dizzy/lightheaded. Her  was nearby and helped her sit down / she denies head strike.  states she was unconscious for  several seconds. Pt denies trauma to her right knee. She denies CP, SOB, N/V, tactile fevers, calf pain, dizziness, palpitations. ED attending at bedside - reports labs and EKG / vitals are within normal limits. Patient given fluids and is per the ED stable from their standpoint. Pt denies right knee pain. Attributes this AMs episode to combination of low PO intake and oxycodone.     PAST MEDICAL & SURGICAL HISTORY:  Anxiety and depression      Osteopenia      H/O ovarian cystectomy      History of tonsillectomy      [place] teeth removed        Allergies    No Known Allergies    Intolerances      MEDICATIONS  (STANDING):      Vital Signs Last 24 Hrs  T(C): 36.2 (28 Feb 2023 12:35), Max: 36.2 (28 Feb 2023 12:35)  T(F): 97.2 (28 Feb 2023 12:35), Max: 97.2 (28 Feb 2023 12:35)  HR: 61 (28 Feb 2023 12:35) (60 - 75)  BP: 102/60 (28 Feb 2023 12:35) (102/60 - 156/67)  BP(mean): 85 (27 Feb 2023 16:20) (85 - 97)  RR: 16 (28 Feb 2023 12:35) (16 - 19)  SpO2: 100% (28 Feb 2023 12:35) (99% - 100%)    Parameters below as of 28 Feb 2023 12:35  Patient On (Oxygen Delivery Method): room air        Physical Exam:  Pt laying comfortably in stretcher, NAD  Skin warm and well perfused; DP pulses palpable bilaterally  Right knee kaley stocking in place, aquacel and gauze dressings CDI   SLT in tact to distal bilateral lower extremities   Pt is able to painlessly range her right knee, right knee is nontender to palpation  EHL/FHL/TA/GS 5/5 right lower extremity  Bilateral calves nontender to palpation                           9.9    12.00 )-----------( 205      ( 28 Feb 2023 12:56 )             30.3     02-28    135  |  102  |  19  ----------------------------<  107<H>  4.2   |  27  |  0.97    Ca    9.0      28 Feb 2023 12:56  Phos  3.5     02-28  Mg     1.8     02-28    TPro  6.2  /  Alb  4.0  /  TBili  0.2  /  DBili  x   /  AST  20  /  ALT  10  /  AlkPhos  52  02-28      A/P: 67yFemale w/ orthostatic/syncopal episode POD #1 s/p right UKR   Discussed with Dr. Guillory  No further work up from orthopaedic standpoint   Advised patient on increasing PO intake in setting of post op period and opioid use   WBAT  Pt aware to call Dr. Guillory's office with any further questions or concerns, will follow up as scheduled  Dispo: Home pending ED clearance for discharge

## 2023-02-28 NOTE — ED ADULT NURSE NOTE - NSIMPLEMENTINTERV_GEN_ALL_ED
Implemented All Fall with Harm Risk Interventions:  [place] to call system. Call bell, personal items and telephone within reach. Instruct patient to call for assistance. Room bathroom lighting operational. Non-slip footwear when patient is off stretcher. Physically safe environment: no spills, clutter or unnecessary equipment. Stretcher in lowest position, wheels locked, appropriate side rails in place. Provide visual cue, wrist band, yellow gown, etc. Monitor gait and stability. Monitor for mental status changes and reorient to person, place, and time. Review medications for side effects contributing to fall risk. Reinforce activity limits and safety measures with patient and family. Provide visual clues: red socks.

## 2023-02-28 NOTE — ED PROVIDER NOTE - MUSCULOSKELETAL, MLM
Spine appears normal, range of motion is not limited, , rt knee with post op bandage and minimal swelling w no surrounding erythema, warm and well perfused.

## 2023-02-28 NOTE — ED PROVIDER NOTE - PATIENT PORTAL LINK FT
You can access the FollowMyHealth Patient Portal offered by [facility] by registering at the following website: [email or web address]. By joining [facility]’s FollowMyHealth portal, you will also be able to view your health information using other applications (apps) compatible with our system.

## 2023-02-28 NOTE — ED PROVIDER NOTE - NSICDXPASTSURGICALHX_GEN_ALL_CORE_FT
PAST SURGICAL HISTORY:  H/O ovarian cystectomy     History of tonsillectomy     [place] teeth removed

## 2023-02-28 NOTE — ED PROVIDER NOTE - OBJECTIVE STATEMENT
67-year-old with history of depression and osteoarthritis with knee replacement yesterday right knee, had a dose of oxy Cotolone and morphine yesterday, decreased p.o. intake, this morning as she was getting up to take Tylenol she suddenly felt lightheaded and her  quickly put a chair behind her she sat down and lost consciousness for several seconds then recovered.  No palpitations no headache no chest pain no shortness of breath.  Feels generally tired right now, no fever.  No injuries occurred as she was lowered to the chair with .

## 2023-02-28 NOTE — ED ADULT TRIAGE NOTE - CHIEF COMPLAINT QUOTE
Pt presents to ED C/O syncope episode with lightheadedness and nausea this AM at 9 AM while taking medication. Witnessed by  who helped pt into a chair. +LOC, denies head strike, injuries, blood thinners. Denies SOB, CP. EKG in progress. Pt had R sided partial knee replacement surgery yesterday.

## 2023-03-01 PROBLEM — F41.9 ANXIETY DISORDER, UNSPECIFIED: Chronic | Status: ACTIVE | Noted: 2023-02-10

## 2023-03-01 PROBLEM — M85.80 OTHER SPECIFIED DISORDERS OF BONE DENSITY AND STRUCTURE, UNSPECIFIED SITE: Chronic | Status: ACTIVE | Noted: 2023-02-10

## 2023-03-14 ENCOUNTER — APPOINTMENT (OUTPATIENT)
Dept: ORTHOPEDIC SURGERY | Facility: CLINIC | Age: 68
End: 2023-03-14
Payer: MEDICARE

## 2023-03-14 VITALS
WEIGHT: 132 LBS | DIASTOLIC BLOOD PRESSURE: 72 MMHG | SYSTOLIC BLOOD PRESSURE: 154 MMHG | OXYGEN SATURATION: 97 % | HEIGHT: 62 IN | HEART RATE: 70 BPM | BODY MASS INDEX: 24.29 KG/M2

## 2023-03-14 PROCEDURE — 99024 POSTOP FOLLOW-UP VISIT: CPT

## 2023-03-14 NOTE — HISTORY OF PRESENT ILLNESS
[de-identified] : first post op for right knee medial unicompartmental knee arthroplasty , surgical date 02/27/2023  [de-identified] : 68 y/o female f/u for 1st postop visit following right medial partial knee replacement performed on 2/27/23. She had one episode of dizziness and syncope the day after d/c for which she was evaluated in the ED and d/c after negative cardiac workup. She denies any further episodes since returning home. Her pain is mainly controlled with primarily Tylenol and occasional nighttime doses of oxycodone. She is no longer using the morphine. She has been participating in PT and finds it is going well. She ambulates without the cane in the home but takes the cane outside. She notes no wound or systemic issues. She does complain of disordered sleep patterns.  [de-identified] : Gait: presents using a cane and shows mild right sided caution and antalgia\par \par Right knee:\par - Focal soft tissue swelling: none\par - Ecchymosis: none\par - Erythema: none\par - Effusion: small, no Baker's cyst\par - Wounds: right midline knee incision is well approximated with slight adele-incisional scabbing. Femoral and tibial stab incisions are well healed and sutures were removed by me today. \par - Alignment: normal\par - Tenderness: none\par - ROM: 3-100\par - Collateral laxity: none\par - Cruciate laxity: none \par - Quad strength: 5/5 [de-identified] : 66 y/o female 2 weeks s/p right knee medial unicompartmental replacement, doing well.  [de-identified] : - I encouraged her to wean the use of her opioids and her assistive device as tolerated. \par - She is to continue with HEP and PT. \par - F/u in 4 weeks with repeat right knee XR.

## 2023-03-14 NOTE — END OF VISIT
[FreeTextEntry3] : All medical record entries made by the Scribe were at my, Dr. Erik Guillory, direction and personally dictated by me on 03/14/2023. I have reviewed the chart and agree that the record accurately reflects my personal performance of the history, physical exam, assessment and plan. I have also personally directed, reviewed, and agreed with the chart.

## 2023-04-11 ENCOUNTER — OUTPATIENT (OUTPATIENT)
Dept: OUTPATIENT SERVICES | Facility: HOSPITAL | Age: 68
LOS: 1 days | End: 2023-04-11
Payer: MEDICARE

## 2023-04-11 ENCOUNTER — APPOINTMENT (OUTPATIENT)
Dept: ORTHOPEDIC SURGERY | Facility: CLINIC | Age: 68
End: 2023-04-11
Payer: MEDICARE

## 2023-04-11 ENCOUNTER — RESULT REVIEW (OUTPATIENT)
Age: 68
End: 2023-04-11

## 2023-04-11 DIAGNOSIS — M17.11 UNILATERAL PRIMARY OSTEOARTHRITIS, RIGHT KNEE: ICD-10-CM

## 2023-04-11 DIAGNOSIS — Z98.890 OTHER SPECIFIED POSTPROCEDURAL STATES: Chronic | ICD-10-CM

## 2023-04-11 DIAGNOSIS — Z90.89 ACQUIRED ABSENCE OF OTHER ORGANS: Chronic | ICD-10-CM

## 2023-04-11 DIAGNOSIS — K08.409 PARTIAL LOSS OF TEETH, UNSPECIFIED CAUSE, UNSPECIFIED CLASS: Chronic | ICD-10-CM

## 2023-04-11 PROCEDURE — 99024 POSTOP FOLLOW-UP VISIT: CPT

## 2023-04-11 PROCEDURE — 73564 X-RAY EXAM KNEE 4 OR MORE: CPT | Mod: 26,RT

## 2023-04-11 PROCEDURE — 73564 X-RAY EXAM KNEE 4 OR MORE: CPT

## 2023-04-11 NOTE — HISTORY OF PRESENT ILLNESS
[de-identified] : second post op for right knee medial unicompartmental knee arthroplasty , surgical date 02/27/2023.  [de-identified] : 67 y.o female presenting about 6 weeks s/p right UKA. She continues to have right knee pain and swelling, primarily with walking downstairs. She takes ibuprofen and meloxicam as needed for pain. I advised her to d/c the ibuprofen and take meloxicam and Tylenol as needed. She continues to attend PT 2x per week. Overall she gets good relief. She admits some lateral greater than medial joint line pain with occasional radiation down the medial tibia. She is no longer using any assistive devices.  [de-identified] : Gait: no assistive device, some right sided caution but improved antalgia since last visit\par \par Right knee: \par - Focal soft tissue swelling: none\par - Ecchymosis: none\par - Erythema: none\par - Effusion: trace, no Baker's cyst\par - Wounds: well healed short midline incision, benign appearing. Well healed tibial stab incisions \par - Alignment: normal\par - Tenderness: diminished sensation to light touch lateral to the incision and some hypersensitivity along the lateral joint line\par - ROM: 2-130\par - Collateral laxity: none\par - Cruciate laxity: none\par - Popliteal angle (degrees): 15\par - Quad strength: 5/5 [de-identified] : 4 radiographic views were taken of the right knee. These demonstrate stable position of her right medial unicompartmental knee arthroplasty as compared to previous imaging without evidence of mechanical complication. Patella sits at normal height and tracks centrally.  [de-identified] : 68 y/o female now 6 weeks s/p right medial UKA.  [de-identified] : - I think that she has made significant improvement as compared to her last visit. I do not think that there is any critical issue at hand. I do think she has somewhat more pain than expected for this juncture for a partial knee replacement but I anticipate spontaneous improvement over time. \par - I encouraged her to continue with PT and HEP with a target ambulation distance of 8,000-10,000 steps per day. I provided her with a letter to return to work part time on 4/24/23. \par - F/u in 2 months with repeat right knee XR.

## 2023-04-11 NOTE — END OF VISIT
[FreeTextEntry3] : All medical record entries made by the Scribe were at my, Dr. Erik Guillory, direction and personally dictated by me on 04/11/2023. I have reviewed the chart and agree that the record accurately reflects my personal performance of the history, physical exam, assessment and plan. I have also personally directed, reviewed, and agreed with the chart.

## 2023-06-14 ENCOUNTER — OUTPATIENT (OUTPATIENT)
Dept: OUTPATIENT SERVICES | Facility: HOSPITAL | Age: 68
LOS: 1 days | End: 2023-06-14
Payer: MEDICARE

## 2023-06-14 ENCOUNTER — RESULT REVIEW (OUTPATIENT)
Age: 68
End: 2023-06-14

## 2023-06-14 ENCOUNTER — APPOINTMENT (OUTPATIENT)
Dept: ORTHOPEDIC SURGERY | Facility: CLINIC | Age: 68
End: 2023-06-14
Payer: MEDICARE

## 2023-06-14 VITALS
OXYGEN SATURATION: 98 % | HEART RATE: 77 BPM | HEIGHT: 62 IN | SYSTOLIC BLOOD PRESSURE: 116 MMHG | DIASTOLIC BLOOD PRESSURE: 68 MMHG | BODY MASS INDEX: 24.29 KG/M2 | WEIGHT: 132 LBS

## 2023-06-14 DIAGNOSIS — M21.169 VARUS DEFORMITY, NOT ELSEWHERE CLASSIFIED, UNSPECIFIED KNEE: ICD-10-CM

## 2023-06-14 DIAGNOSIS — Z98.890 OTHER SPECIFIED POSTPROCEDURAL STATES: Chronic | ICD-10-CM

## 2023-06-14 DIAGNOSIS — Z90.89 ACQUIRED ABSENCE OF OTHER ORGANS: Chronic | ICD-10-CM

## 2023-06-14 DIAGNOSIS — K08.409 PARTIAL LOSS OF TEETH, UNSPECIFIED CAUSE, UNSPECIFIED CLASS: Chronic | ICD-10-CM

## 2023-06-14 PROCEDURE — 73564 X-RAY EXAM KNEE 4 OR MORE: CPT

## 2023-06-14 PROCEDURE — 99213 OFFICE O/P EST LOW 20 MIN: CPT

## 2023-06-14 PROCEDURE — 73564 X-RAY EXAM KNEE 4 OR MORE: CPT | Mod: 26,RT

## 2023-06-14 RX ORDER — MELOXICAM 7.5 MG/1
7.5 TABLET ORAL DAILY
Qty: 30 | Refills: 2 | Status: ACTIVE | COMMUNITY
Start: 2023-06-14 | End: 1900-01-01

## 2023-06-15 PROBLEM — M21.169 ACQUIRED VARUS DEFORMITY OF KNEE: Noted: 2022-05-12

## 2023-06-15 NOTE — PHYSICAL EXAM
[de-identified] :  General appearance: well nourished and hydrated, pleasant, alert and oriented x 3, cooperative.  \par HEENT: normocephalic, EOM intact, wearing mask, external auditory canal clear.  \par Cardiovascular: no lower leg edema, no varicosities, dorsalis pedis pulses palpable and symmetric.  \par Lymphatics: no palpable lymphadenopathy, no lymphedema.  \par Neurologic: sensation is normal, no muscle weakness in upper or lower extremities, patella tendon reflexes present and symmetric.  \par Dermatologic: skin moist, warm, no rash.  \par Spine: cervical spine with normal lordosis and painless range of motion, thoracic spine with normal kyphosis and painless range of motion, lumbosacral spine with normal lordosis and painless range of motion.  No tenderness to palpation along midline spine and paraspinal musculature.  Sacroiliac joints nontender bilaterally. Negative SLR and crossed SLR tests bilaterally.\par Gait: no assistive device, stable nonantalgic gait pattern \par \par Right knee:\par - Focal soft tissue swelling: none\par - Ecchymosis: none\par - Erythema: none\par - Effusion: none, no Baker's cyst\par - Wounds: well healed surgical incision, benign appearing with healed tibial stab incisions\par - Alignment: normal\par - Tenderness: TTP along the medial joint line\par - ROM: 2-125\par - Collateral laxity: none\par - Cruciate laxity: none\par - Popliteal angle (degrees): 15\par - Quad strength: 5/5 [de-identified] : 4 radiographic views were taken of the right knee. These demonstrate stable position of her medial UKA as compared to previous imaging without evidence of mechanical complication. The degree of lateral and patellofemoral compartment OA at this time appears to be minimal and not progressed as compared to previous imaging. Knee alignment is normal. Patella sits at normal height and tracks centrally.

## 2023-06-15 NOTE — END OF VISIT
[FreeTextEntry3] : All medical record entries made by the Scribe were at my, Dr. Erik Guillory, direction and personally dictated by me on 06/14/2023. I have reviewed the chart and agree that the record accurately reflects my personal performance of the history, physical exam, assessment and plan. I have also personally directed, reviewed, and agreed with the chart.

## 2023-06-15 NOTE — HISTORY OF PRESENT ILLNESS
[de-identified] : R medial UKA, 2/27/23\par \par 06/14/2023: 66 y/o female 3.5 months s/p right UKA, doing well overall. Today her pain has improved since she was last seen by us. She is taking Tylenol and meloxicam as needed for pain and is not using ambulatory devices. She does still report some trepidation going downstairs. She states she is addressing this in PT. She would like a new referral for PT and a refill of meloxicam. She would also like a letter to return to work. \par \par 9/30/22: 66 y/o female referred by Dr. Linares for evaluation of right medial compartment OA for which she has undergone an extensive course of conservative management, here primarily to discuss possibility of right medal compartmental knee arthroplasty. The patient has been dealing with right knee pain for several years which is localized to the medial joint line with radiation down to the mid-tibia. She also notes plantar fasciitis of the right foot. She states her most recent treatment so far has been a Durolane injection which provided good relief. She takes Tylenol as needed for pain. She denies any ambulatory limitation but notes her pain is worse when walking up and down steps. 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 She lives with her  in a flat apartment that has an elevator. \par \par PMH: osteoporosis, depression and anxiety.\par \par She denies any allergies.

## 2023-06-15 NOTE — DISCUSSION/SUMMARY
[de-identified] : 66 y/o female about 3.5 months s/p right medial UKA. \par - We provided the patient a new referral for PT as well a a refill on her meloxicam and a letter to return to work. \par - RTC q February with repeat right knee XR. She is to call the office if she needs to f/u sooner.

## 2023-09-06 ENCOUNTER — NON-APPOINTMENT (OUTPATIENT)
Age: 68
End: 2023-09-06

## 2024-02-09 ENCOUNTER — RESULT REVIEW (OUTPATIENT)
Age: 69
End: 2024-02-09

## 2024-02-09 ENCOUNTER — OUTPATIENT (OUTPATIENT)
Dept: OUTPATIENT SERVICES | Facility: HOSPITAL | Age: 69
LOS: 1 days | End: 2024-02-09
Payer: MEDICARE

## 2024-02-09 ENCOUNTER — APPOINTMENT (OUTPATIENT)
Dept: ORTHOPEDIC SURGERY | Facility: CLINIC | Age: 69
End: 2024-02-09
Payer: MEDICARE

## 2024-02-09 VITALS
BODY MASS INDEX: 24.29 KG/M2 | HEART RATE: 72 BPM | HEIGHT: 62 IN | DIASTOLIC BLOOD PRESSURE: 70 MMHG | WEIGHT: 132 LBS | OXYGEN SATURATION: 96 % | SYSTOLIC BLOOD PRESSURE: 149 MMHG

## 2024-02-09 DIAGNOSIS — Z98.890 OTHER SPECIFIED POSTPROCEDURAL STATES: Chronic | ICD-10-CM

## 2024-02-09 DIAGNOSIS — K08.409 PARTIAL LOSS OF TEETH, UNSPECIFIED CAUSE, UNSPECIFIED CLASS: Chronic | ICD-10-CM

## 2024-02-09 DIAGNOSIS — Z90.89 ACQUIRED ABSENCE OF OTHER ORGANS: Chronic | ICD-10-CM

## 2024-02-09 DIAGNOSIS — Z96.651 AFTERCARE FOLLOWING JOINT REPLACEMENT SURGERY: ICD-10-CM

## 2024-02-09 DIAGNOSIS — Z47.1 AFTERCARE FOLLOWING JOINT REPLACEMENT SURGERY: ICD-10-CM

## 2024-02-09 PROCEDURE — 73564 X-RAY EXAM KNEE 4 OR MORE: CPT

## 2024-02-09 PROCEDURE — 99213 OFFICE O/P EST LOW 20 MIN: CPT

## 2024-02-09 PROCEDURE — 73564 X-RAY EXAM KNEE 4 OR MORE: CPT | Mod: 26,RT

## 2024-02-20 PROBLEM — Z47.1 AFTERCARE FOLLOWING RIGHT KNEE JOINT REPLACEMENT SURGERY: Status: ACTIVE | Noted: 2023-02-22

## 2024-02-20 NOTE — PHYSICAL EXAM
[de-identified] :  General appearance: well nourished and hydrated, pleasant, alert and oriented x 3, cooperative.   HEENT: normocephalic, EOM intact, wearing mask, external auditory canal clear.   Cardiovascular: no lower leg edema, no varicosities, dorsalis pedis pulses palpable and symmetric.   Lymphatics: no palpable lymphadenopathy, no lymphedema.   Neurologic: sensation is normal, no muscle weakness in upper or lower extremities, patella tendon reflexes present and symmetric.   Dermatologic: skin moist, warm, no rash.   Spine: cervical spine with normal lordosis and painless range of motion, thoracic spine with normal kyphosis and painless range of motion, lumbosacral spine with normal lordosis and painless range of motion.  No tenderness to palpation along midline spine and paraspinal musculature.  Sacroiliac joints nontender bilaterally. Negative SLR and crossed SLR tests bilaterally. Gait: demonstrates a stable non antalgic gait pattern without the use of any assistive device  Right knee: - Focal soft tissue swelling: none - Ecchymosis: none - Erythema: none - Effusion: none - Wounds: well healed medial parapatellar incision, benign appearing - Alignment: normal - Tenderness: medial and lateral joint lines non tender to palpation, mild tenderness with patella femoral compression test but no associated crepitus - ROM: 0-135 - Collateral laxity: stable - Cruciate laxity: stable - Popliteal angle (degrees): 15 - Quad strength: 5/5 [de-identified] : Right knee x-rays repeated 2/9/24 [facility] - These demonstrate stable appearance of a right medial untit compartmental knee arthroplasty as compared to prior imaging without evidence of mechanical complication. - There remains minimal lateral and patellofemoral compartment disease not significantly progressed as comapred to prior imaging. - Patella sits at normal height and tracks centrally.

## 2024-02-20 NOTE — DISCUSSION/SUMMARY
[de-identified] : 68F in a 1 year status post right medial unit compartmental knee arthroplasty overall doing well at this time with relatively mild ongoing chondromalacia patella.  - She's doing well at this time, her partial knee replacement is stable and there has been no progression of osteoarthritis of other compartments. - She has effectively dealt with the chondromalacia patella with activity modification, which I think is appropriate, and she is already aprticipating in an appropriate home exercise program. - We will continue to follow up annually with repeat right knee x-rays earlier as needed for any new or worsening symptoms.

## 2024-02-20 NOTE — END OF VISIT
Angiogram/ gastrointestinal bleed
Bilateral nephrostomy tube exchange and upsize
[FreeTextEntry3] :  All medical record entries made by the Scribe were at my, Dr. Erik Guillory's, direction and personally dictated by me on 02/09/2024. I have reviewed the chart and agree that the record accurately reflects my personal performance of the history, physical exam, assessment and plan. I have also personally directed, reviewed, and agreed with the chart.

## 2024-02-20 NOTE — HISTORY OF PRESENT ILLNESS
[de-identified] : R medial UKA, 2/27/23 2/9/24 68F presents today for her annual follow up regarding her right medial knee replacement. She's been doing well since we last saw her. She has not pain, though she does describe some form of discomfort or a different feeling when she kneels down on her right knee. She takes care of this by using a gardening pad if she has to do it, but has no other issues to describe today. She remains active, she says she walks almost constantly for several hours a day, does not require any pain medications of any kinds for her knee, feels satisfied with the result. She states that there is nothing that the knee has limited her from doing and she's living the life of doing things she would like to do otherwise.  06/14/2023: 68 y/o female 3.5 months s/p right UKA, doing well overall. Today her pain has improved since she was last seen by us. She is taking Tylenol and meloxicam as needed for pain and is not using ambulatory devices. She does still report some trepidation going downstairs. She states she is addressing this in PT. She would like a new referral for PT and a refill of meloxicam. She would also like a letter to return to work.   9/30/22: 68 y/o female referred by Dr. Linares for evaluation of right medial compartment OA for which she has undergone an extensive course of conservative management, here primarily to discuss possibility of right medal compartmental knee arthroplasty. The patient has been dealing with right knee pain for several years which is localized to the medial joint line with radiation down to the mid-tibia. She also notes plantar fasciitis of the right foot. She states her most recent treatment so far has been a Durolane injection which provided good relief. She takes Tylenol as needed for pain. She denies any ambulatory limitation but notes her pain is worse when walking up and down steps. 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 She lives with her  in a flat apartment that has an elevator.   PMH: osteoporosis, depression and anxiety.  She denies any allergies.

## 2024-03-14 ASSESSMENT — KOOS JR
STANDING UPRIGHT: MILD
STRAIGHTENING KNEE FULLY: MODERATE
TWISING OR PIVOTING ON KNEE: MODERATE
GOING UP OR DOWN STAIRS: MILD
BENDING TO THE FLOOR TO PICK UP OBJECT: MODERATE
RISING FROM SITTING: MILD
KOOS JR RAW SCORE: 11
HOW SEVERE IS YOUR KNEE STIFFNESS AFTER FIRST WAKING IN MORNING: MODERATE

## 2024-04-19 ENCOUNTER — NON-APPOINTMENT (OUTPATIENT)
Age: 69
End: 2024-04-19

## 2024-08-28 ENCOUNTER — APPOINTMENT (OUTPATIENT)
Dept: OTOLARYNGOLOGY | Facility: CLINIC | Age: 69
End: 2024-08-28
Payer: MEDICARE

## 2024-08-28 DIAGNOSIS — H93.299 OTHER ABNORMAL AUDITORY PERCEPTIONS, UNSPECIFIED EAR: ICD-10-CM

## 2024-08-28 PROCEDURE — 92557 COMPREHENSIVE HEARING TEST: CPT

## 2024-08-28 PROCEDURE — 92550 TYMPANOMETRY & REFLEX THRESH: CPT | Mod: 52

## 2024-09-12 ENCOUNTER — APPOINTMENT (OUTPATIENT)
Dept: OTOLARYNGOLOGY | Facility: CLINIC | Age: 69
End: 2024-09-12
Payer: MEDICARE

## 2024-09-12 VITALS — BODY MASS INDEX: 24.84 KG/M2 | WEIGHT: 135 LBS | HEIGHT: 62 IN

## 2024-09-12 DIAGNOSIS — Z83.3 FAMILY HISTORY OF DIABETES MELLITUS: ICD-10-CM

## 2024-09-12 DIAGNOSIS — Z82.5 FAMILY HISTORY OF ASTHMA AND OTHER CHRONIC LOWER RESPIRATORY DISEASES: ICD-10-CM

## 2024-09-12 DIAGNOSIS — Z78.9 OTHER SPECIFIED HEALTH STATUS: ICD-10-CM

## 2024-09-12 DIAGNOSIS — J30.89 OTHER ALLERGIC RHINITIS: ICD-10-CM

## 2024-09-12 DIAGNOSIS — H93.299 OTHER ABNORMAL AUDITORY PERCEPTIONS, UNSPECIFIED EAR: ICD-10-CM

## 2024-09-12 DIAGNOSIS — Z82.49 FAMILY HISTORY OF ISCHEMIC HEART DISEASE AND OTHER DISEASES OF THE CIRCULATORY SYSTEM: ICD-10-CM

## 2024-09-12 DIAGNOSIS — Z87.891 PERSONAL HISTORY OF NICOTINE DEPENDENCE: ICD-10-CM

## 2024-09-12 DIAGNOSIS — H93.13 TINNITUS, BILATERAL: ICD-10-CM

## 2024-09-12 PROCEDURE — 99203 OFFICE O/P NEW LOW 30 MIN: CPT

## 2024-09-12 RX ORDER — CHROMIUM 200 MCG
TABLET ORAL
Refills: 0 | Status: ACTIVE | COMMUNITY

## 2024-09-12 RX ORDER — ATORVASTATIN CALCIUM 80 MG/1
TABLET, FILM COATED ORAL
Refills: 0 | Status: ACTIVE | COMMUNITY

## 2024-09-12 NOTE — HISTORY OF PRESENT ILLNESS
[de-identified] : CC: f/u audio   HISTORY OF PRESENT ILLNESS: Cha Crouch is a 70 y/o female who presents today for 2 week f/u for tymp she was seen 2 weeks ago by audiologist Dr. Carlos Levi. audio: R-Hearing -1kHz sloping to a mild to mod SNHL, L-Hearing -500Hz sloping to a mild to mod SNHL, L tymp: B today she reports tinnitus and seasonal allergies and does not take any medication for it some improvement since the audiogram    REVIEW OF SYSTEMS: General ROS: negative for - chills, fatigue or fever Psychological ROS: negative for - anxiety or depression Ophthalmic ROS: negative for - blurry vision, decreased vision or double vision ENT ROS: negative except as noted from HPI Allergy and Immunology ROS: negative except as noted from HPI Hematological and Lymphatic ROS: negative for - bleeding problems Endocrine ROS: negative for - malaise/lethargy Respiratory ROS: negative for - stridor Cardiovascular ROS: negative for - chest pain Gastrointestinal ROS: negative for - appetite loss or nausea/vomiting Genitourinary ROS: negative for - incontinence Musculoskeletal ROS: negative for - gait disturbance Neurological ROS: negative for - behavioral changes Dermatological ROS: negative for - nail changes   Physical Exam:   GENERAL APPEARANCE: Well-developed and No Acute Distress. COMMUNICATION: Able to Communicate. Strong Voice.   HEAD AND FACE Eyes: Testing of ocular motility including primary gaze alignment normal. Inspection and Appearance: No evidence of lesions or masses Palpation: Palpation of the face reveals no sinus tenderness Salivary Glands: Symmetric without masses Facial Strength: Symmetric without evidence of facial paralysis   EAR, NOSE, MOUTH, and THROAT: Ear Canals and Tympanic Membranes, Bilateral: No evidence of inflammation or lesions. AU EAC clear TMI no fluid seen Thresholds: Clinical speech reception thresholds normal. External, Nose and Auricle: No lesions or masses. Nasal, Mucosa, Septum, and Turbinates: See endoscopy.   NECK: Evaluation: No evidence of masses or crepitus. The neck is symmetric and the trachea is in the midline position. Thyroid: No evidence of enlargement, tenderness or mass. Neck Lymph Nodes: WNL. Respiratory: Inspection of the chest including symmetry, expansion and/or assessment of respiratory effort normal. Cardiovascular: Evaluation of peripheral vascular system by observation and palpation of capillary refill, normal. Neurological/Psychiatric: Alert, Oriented, Mood, and Affect Normal.   IMPRESSION: Ms. Crouch is a pleasant 69 year old lady with ETD, type B tymp resolved, seasonal allergies   PLAN: -tympanometry interpreted by me today shows type As instead of B -encouraged antihistamines as needed -RTC PRN       Juan Larkin MD [facility] Rhinology and Skull Base Surgery Department of Otolaryngology- Head and Neck Surgery [facility]

## 2025-01-27 ENCOUNTER — NON-APPOINTMENT (OUTPATIENT)
Age: 70
End: 2025-01-27

## 2025-02-07 ENCOUNTER — RESULT REVIEW (OUTPATIENT)
Age: 70
End: 2025-02-07

## 2025-02-07 ENCOUNTER — OUTPATIENT (OUTPATIENT)
Dept: OUTPATIENT SERVICES | Facility: HOSPITAL | Age: 70
LOS: 1 days | End: 2025-02-07
Payer: MEDICARE

## 2025-02-07 ENCOUNTER — APPOINTMENT (OUTPATIENT)
Dept: ORTHOPEDIC SURGERY | Facility: CLINIC | Age: 70
End: 2025-02-07
Payer: MEDICARE

## 2025-02-07 VITALS
SYSTOLIC BLOOD PRESSURE: 147 MMHG | HEART RATE: 69 BPM | OXYGEN SATURATION: 99 % | BODY MASS INDEX: 25.03 KG/M2 | WEIGHT: 136 LBS | HEIGHT: 62 IN | DIASTOLIC BLOOD PRESSURE: 79 MMHG

## 2025-02-07 DIAGNOSIS — Z47.1 AFTERCARE FOLLOWING JOINT REPLACEMENT SURGERY: ICD-10-CM

## 2025-02-07 DIAGNOSIS — Z96.651 AFTERCARE FOLLOWING JOINT REPLACEMENT SURGERY: ICD-10-CM

## 2025-02-07 DIAGNOSIS — Z90.89 ACQUIRED ABSENCE OF OTHER ORGANS: Chronic | ICD-10-CM

## 2025-02-07 DIAGNOSIS — K08.409 PARTIAL LOSS OF TEETH, UNSPECIFIED CAUSE, UNSPECIFIED CLASS: Chronic | ICD-10-CM

## 2025-02-07 DIAGNOSIS — Z98.890 OTHER SPECIFIED POSTPROCEDURAL STATES: Chronic | ICD-10-CM

## 2025-02-07 PROCEDURE — 99213 OFFICE O/P EST LOW 20 MIN: CPT

## 2025-02-07 PROCEDURE — 73564 X-RAY EXAM KNEE 4 OR MORE: CPT | Mod: 26,RT

## 2025-02-07 PROCEDURE — 73564 X-RAY EXAM KNEE 4 OR MORE: CPT

## 2025-04-29 NOTE — H&P ADULT - NSICDXPASTSURGICALHX_GEN_ALL_CORE_FT
[facility]  EMERGENCY DEPARTMENT ENCOUNTER      Pt Name: Nataly Jovel  MRN: [id number]  Birthdate 1939  Date of evaluation: 4/29/2025  Provider: ANASTASIIA LOPEZ    CHIEF COMPLAINT       Chief Complaint   Patient presents with    Cough         HISTORY OF PRESENT ILLNESS   (Location/Symptom, Timing/Onset, Context/Setting, Quality, Duration, Modifying Factors, Severity)  Note limiting factors.   85-year-old female presents ED with cough.  Patient arrives with daughter and reports that for the past 5 days she has had a cough and runny nose.  Notes that daughter had similar symptoms last week.  Denies associated fever, chills, shortness of breath, nausea or vomiting.  She notes history of asthma but has not felt wheezy.  She has not tried any medications for symptoms.    The history is provided by the patient.         Review of External Medical Records:     Nursing Notes were reviewed.    REVIEW OF SYSTEMS    (2-9 systems for level 4, 10 or more for level 5)     Review of Systems   Constitutional:  Negative for chills and fever.   HENT:  Negative for congestion, ear pain and sore throat.    Eyes:  Negative for pain.   Respiratory:  Positive for cough. Negative for shortness of breath.    Cardiovascular:  Negative for chest pain.   Gastrointestinal:  Negative for abdominal pain, diarrhea, nausea and vomiting.   Genitourinary:  Negative for dysuria and flank pain.   Musculoskeletal:  Negative for myalgias.   Skin:  Negative for rash.   Neurological:  Negative for dizziness and headaches.   Hematological:  Negative for adenopathy.       Except as noted above the remainder of the review of systems was reviewed and negative.       PAST MEDICAL HISTORY     Past Medical History:   Diagnosis Date    Arthritis     Asthma 4/1992    adult onset asthma    Breast cancer (HCC) 04/21/2015    IDC, DCIS    Cancer (HCC)     right breast    Depression     GERD (gastroesophageal reflux disease)      PAST SURGICAL HISTORY:  H/O ovarian cystectomy     History of tonsillectomy     [place] teeth removed

## (undated) DEVICE — SUT STRATAFIX SYMMETRIC PDS 1 45CM OS-6

## (undated) DEVICE — SUT STRATAFIX SPIRAL PDO 0 24CM CP-2

## (undated) DEVICE — ELCTR BOVIE PENCIL BLADE 10FT

## (undated) DEVICE — WARMING BLANKET UPPER ADULT

## (undated) DEVICE — SUT VICRYL 2-0 27" CT-1 UNDYED

## (undated) DEVICE — DRSG AQUACEL 3.5 X 10"

## (undated) DEVICE — DRSG COBAN 6"

## (undated) DEVICE — MAKO BALL BUR

## (undated) DEVICE — SUT STRATAFIX SPIRAL PDO 1 30CM OS-6

## (undated) DEVICE — ELCTR AQUAMANTYS BIPOLAR SEALER 6.0

## (undated) DEVICE — MAKO VIZADISC KNEE TRACKING KIT

## (undated) DEVICE — SYR LUER LOK 50CC

## (undated) DEVICE — BLADE SCALPEL SAFETYLOCK #15

## (undated) DEVICE — DRAPE LIGHT HANDLE COVER (GREEN)

## (undated) DEVICE — SAW BLADE STRYKER SAGITTAL DUAL CUT TEETH 35X64X.64MM

## (undated) DEVICE — SUT ETHILON 3-0 18" PS-1

## (undated) DEVICE — SUCTION YANKAUER NO CONTROL VENT

## (undated) DEVICE — CLIP IRRIGATION MICS STRL

## (undated) DEVICE — HOOD T5 PEELAWAY

## (undated) DEVICE — POLISHER OR CAUTERY TIP

## (undated) DEVICE — POSITIONER FOAM EGG CRATE ULNAR 2PCS (PINK)

## (undated) DEVICE — DRSG STOCKINETTE IMPERVIOUS XL

## (undated) DEVICE — GLV 7 PROTEXIS ORTHO (CREAM)

## (undated) DEVICE — GLV 7.5 PROTEXIS ORTHO (CREAM)

## (undated) DEVICE — STRYKER 4-PORT MANIFOLD W/SPECIMEN COLLECTION

## (undated) DEVICE — NDL 18G BLUNT FILL PINK

## (undated) DEVICE — PACK TOTAL KNEE

## (undated) DEVICE — MARKING PEN W RULER

## (undated) DEVICE — DRAPE 1/2 SHEET 40X57"

## (undated) DEVICE — MAKO CHECKPOINT KIT FEMORAL / TIBIAL

## (undated) DEVICE — SAW BLADE STRYKER SAGITTAL 25X86.5X1.32MM

## (undated) DEVICE — MAKO BLADE STANDARD

## (undated) DEVICE — MAKO DRAPE KIT

## (undated) DEVICE — NDL HYPO SAFE 22G X 1.5" (BLACK)

## (undated) DEVICE — STRYKER RIO SYSTEM IRRIGATION TUBE

## (undated) DEVICE — VENODYNE/SCD SLEEVE CALF MEDIUM

## (undated) DEVICE — MAKO BLADE NARROW

## (undated) DEVICE — PREP CHLORAPREP HI-LITE ORANGE 26ML

## (undated) DEVICE — DRAPE U POLY BLUE 60X72"

## (undated) DEVICE — DRAPE LIGHT HANDLE COVER (BLUE)

## (undated) DEVICE — SYR ASEPTO

## (undated) DEVICE — POSITIONER LEG WRAP STERILE

## (undated) DEVICE — SAW BLADE STRYKER SAGITTAL 81.5X12.5X1.19MM

## (undated) DEVICE — DRAPE 3/4 SHEET 52X76"

## (undated) DEVICE — SUT VICRYL 1 27" OS-8 UNDYED

## (undated) DEVICE — DRAPE TOWEL BLUE 17" X 24"

## (undated) DEVICE — SUT VICRYL 0 36" CT-1 UNDYED

## (undated) DEVICE — SUT STRATAFIX SPIRAL MONOCRYL PLUS 3-0 30CM PS-1 UNDYED

## (undated) DEVICE — SURGIPHOR STERILE WOUND IRR POVIDONE IODINE